# Patient Record
Sex: MALE | Race: BLACK OR AFRICAN AMERICAN
[De-identification: names, ages, dates, MRNs, and addresses within clinical notes are randomized per-mention and may not be internally consistent; named-entity substitution may affect disease eponyms.]

---

## 2019-10-03 ENCOUNTER — HOSPITAL ENCOUNTER (OUTPATIENT)
Dept: HOSPITAL 53 - M RAD | Age: 54
End: 2019-10-03
Attending: SURGERY
Payer: COMMERCIAL

## 2019-10-03 DIAGNOSIS — F17.200: ICD-10-CM

## 2019-10-03 DIAGNOSIS — R63.4: Primary | ICD-10-CM

## 2019-10-03 NOTE — REP
Chest x-ray:  Two views.

 

History: Abnormal weight loss .

 

Comparison study: No comparison study.  .

 

Findings:  The lungs are well inflated and free of infiltrate.  The pleural

angles are sharp.  The heart size is normal.  Pulmonary vasculature is not

increased.  No significant bony abnormality is seen.

 

Impression:

 

Negative chest x-ray.

 

 

Electronically Signed by

Grover Alberto MD 10/03/2019 01:32 P

## 2020-04-23 ENCOUNTER — HOSPITAL ENCOUNTER (EMERGENCY)
Dept: HOSPITAL 53 - M ED | Age: 55
Discharge: HOME | End: 2020-04-23
Payer: COMMERCIAL

## 2020-04-23 VITALS — SYSTOLIC BLOOD PRESSURE: 121 MMHG | DIASTOLIC BLOOD PRESSURE: 73 MMHG

## 2020-04-23 VITALS — WEIGHT: 220.46 LBS | BODY MASS INDEX: 30.86 KG/M2 | HEIGHT: 71 IN

## 2020-04-23 DIAGNOSIS — F17.210: ICD-10-CM

## 2020-04-23 DIAGNOSIS — M79.662: Primary | ICD-10-CM

## 2020-04-23 DIAGNOSIS — I10: ICD-10-CM

## 2020-04-23 DIAGNOSIS — E11.9: ICD-10-CM

## 2020-04-23 NOTE — REPVR
PROCEDURE INFORMATION: 

Exam: US Duplex Left Lower Extremity Veins, Limited 

Exam date and time: 4/23/2020 6:59 PM 

Age: 54 years old 

Clinical indication: Pain; Leg, lower; Left; Additional info: Lower leg lateral 

pain, was in thigh 



TECHNIQUE: 

Imaging protocol: Real-time Duplex ultrasound of the Left Lower Extremity with 

2-D gray scale, color Doppler flow and spectral waveform analysis with image 

documentation. Limited exam focused on the left lower extremity veins. 



COMPARISON: 

No relevant prior studies available. 



FINDINGS: 

Left deep veins: Unremarkable. The common femoral, femoral, proximal profunda 

femoral and popliteal veins are patent without thrombus. Normal Doppler 

waveforms. Normal compressibility and/or augmentation response.  

Left superficial veins: Unremarkable. Saphenofemoral junction is patent without 

thrombus. 



Soft tissues: Unremarkable. 



IMPRESSION: 

No acute findings. No evidence of deep vein thrombosis. 



Electronically signed by: Isidro Cody On 04/23/2020  19:09:49 PM